# Patient Record
Sex: MALE | Employment: UNEMPLOYED | URBAN - METROPOLITAN AREA
[De-identification: names, ages, dates, MRNs, and addresses within clinical notes are randomized per-mention and may not be internally consistent; named-entity substitution may affect disease eponyms.]

---
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
## 2025-02-23 ENCOUNTER — APPOINTMENT (OUTPATIENT)
Dept: CT IMAGING | Facility: CLINIC | Age: 20
End: 2025-02-23
Attending: EMERGENCY MEDICINE

## 2025-02-23 ENCOUNTER — HOSPITAL ENCOUNTER (EMERGENCY)
Facility: CLINIC | Age: 20
Discharge: OTHER INSTITUTION | End: 2025-02-23
Attending: EMERGENCY MEDICINE | Admitting: EMERGENCY MEDICINE

## 2025-02-23 VITALS
WEIGHT: 124.56 LBS | DIASTOLIC BLOOD PRESSURE: 89 MMHG | RESPIRATION RATE: 18 BRPM | OXYGEN SATURATION: 99 % | HEART RATE: 69 BPM | SYSTOLIC BLOOD PRESSURE: 123 MMHG | BODY MASS INDEX: 21.27 KG/M2 | HEIGHT: 64 IN | TEMPERATURE: 98.5 F

## 2025-02-23 DIAGNOSIS — S02.652A CLOSED FRACTURE OF LEFT MANDIBULAR ANGLE, INITIAL ENCOUNTER (H): ICD-10-CM

## 2025-02-23 PROCEDURE — 258N000003 HC RX IP 258 OP 636: Performed by: EMERGENCY MEDICINE

## 2025-02-23 PROCEDURE — 96375 TX/PRO/DX INJ NEW DRUG ADDON: CPT

## 2025-02-23 PROCEDURE — 99285 EMERGENCY DEPT VISIT HI MDM: CPT | Mod: 25

## 2025-02-23 PROCEDURE — 250N000011 HC RX IP 250 OP 636: Performed by: EMERGENCY MEDICINE

## 2025-02-23 PROCEDURE — 70486 CT MAXILLOFACIAL W/O DYE: CPT

## 2025-02-23 PROCEDURE — 96365 THER/PROPH/DIAG IV INF INIT: CPT

## 2025-02-23 RX ORDER — AMPICILLIN AND SULBACTAM 2; 1 G/1; G/1
3 INJECTION, POWDER, FOR SOLUTION INTRAMUSCULAR; INTRAVENOUS EVERY 6 HOURS
Status: COMPLETED | OUTPATIENT
Start: 2025-02-23 | End: 2025-02-23

## 2025-02-23 RX ORDER — ONDANSETRON 2 MG/ML
4 INJECTION INTRAMUSCULAR; INTRAVENOUS EVERY 30 MIN PRN
Status: DISCONTINUED | OUTPATIENT
Start: 2025-02-23 | End: 2025-02-23 | Stop reason: HOSPADM

## 2025-02-23 RX ORDER — MORPHINE SULFATE 4 MG/ML
4 INJECTION, SOLUTION INTRAMUSCULAR; INTRAVENOUS ONCE
Status: COMPLETED | OUTPATIENT
Start: 2025-02-23 | End: 2025-02-23

## 2025-02-23 RX ORDER — MORPHINE SULFATE 4 MG/ML
4 INJECTION, SOLUTION INTRAMUSCULAR; INTRAVENOUS
Status: COMPLETED | OUTPATIENT
Start: 2025-02-23 | End: 2025-02-23

## 2025-02-23 RX ADMIN — AMPICILLIN SODIUM AND SULBACTAM SODIUM 3 G: 2; 1 INJECTION, POWDER, FOR SOLUTION INTRAMUSCULAR; INTRAVENOUS at 02:31

## 2025-02-23 RX ADMIN — ONDANSETRON 4 MG: 2 INJECTION, SOLUTION INTRAMUSCULAR; INTRAVENOUS at 01:06

## 2025-02-23 RX ADMIN — MORPHINE SULFATE 4 MG: 4 INJECTION, SOLUTION INTRAMUSCULAR; INTRAVENOUS at 01:07

## 2025-02-23 RX ADMIN — MORPHINE SULFATE 4 MG: 4 INJECTION, SOLUTION INTRAMUSCULAR; INTRAVENOUS at 01:35

## 2025-02-23 RX ADMIN — SODIUM CHLORIDE 1000 ML: 9 INJECTION, SOLUTION INTRAVENOUS at 02:32

## 2025-02-23 ASSESSMENT — COLUMBIA-SUICIDE SEVERITY RATING SCALE - C-SSRS
2. HAVE YOU ACTUALLY HAD ANY THOUGHTS OF KILLING YOURSELF IN THE PAST MONTH?: NO
1. IN THE PAST MONTH, HAVE YOU WISHED YOU WERE DEAD OR WISHED YOU COULD GO TO SLEEP AND NOT WAKE UP?: NO
6. HAVE YOU EVER DONE ANYTHING, STARTED TO DO ANYTHING, OR PREPARED TO DO ANYTHING TO END YOUR LIFE?: NO

## 2025-02-23 ASSESSMENT — ACTIVITIES OF DAILY LIVING (ADL)
ADLS_ACUITY_SCORE: 41

## 2025-02-23 NOTE — ED TRIAGE NOTES
Patient arrives to ED w/complaints of jaw/dental/head pain after falling down the stairs at his apartment. Patient denies LOC. Patient denies any other injuries from the fall. Patient is c/o pain rated 8/10 primarily in his jaw. Patient denies any dizziness/lightheadedness/chest pain/SOB.     Triage Assessment (Adult)       Row Name 02/23/25 0043          Triage Assessment    Airway WDL WDL        Respiratory WDL    Respiratory WDL WDL        Skin Circulation/Temperature WDL    Skin Circulation/Temperature WDL WDL        Cardiac WDL    Cardiac WDL WDL        Peripheral/Neurovascular WDL    Peripheral Neurovascular WDL WDL        Cognitive/Neuro/Behavioral WDL    Cognitive/Neuro/Behavioral WDL WDL

## 2025-02-23 NOTE — ED PROVIDER NOTES
"  Emergency Department Note      History of Present Illness     Chief Complaint  Mouth Injury, Dental Injury, and Head Laceration    HPI  Tiago Elias is a 19 year old male who presents the emergency room after a fall in which he took a direct hit to the corner of a staircase against his left jaw.  He states this happened shortly prior to arrival, and he has significant pain in that area and cannot really close his teeth or open or close his mouth without any significant pain.  Does have significant bleeding noted from the left side of his jaw as well.  Denies any head trauma, no nausea or vomiting.      Independent Historian  Yes patient's girlfriend is at the bedside and confirms the above history.    Review of External Notes  No recent notes      Past Medical History   Medical History and Problem List  No past medical history on file.    Medications  No current outpatient medications on file.      Surgical History   Past Surgical History:   Procedure Laterality Date    ORTHOPEDIC SURGERY      left hand         Physical Exam   Patient Vitals for the past 24 hrs:   BP Temp Temp src Pulse Resp SpO2 Height Weight   02/23/25 0040 139/85 98.5  F (36.9  C) Temporal 90 18 99 % 1.626 m (5' 4\") 56.5 kg (124 lb 9 oz)       Physical Exam  Vitals: reviewed by me  General: Pt seen on [facility], cooperative, and alert to conversation  Eyes: Tracking well, clear conjunctiva BL  ENT: MMM, midline trachea.  Oropharynx with dried blood noted throughout as well as fresh blood noted in obvious deformity in patient's left mandibular molar line.  Tolerating secretions and blood, airway patent.  Lungs: No tachypnea, no accessory muscle use. No respiratory distress.   CV: Rate as above significant swelling noted to left mandible,  MSK: no joint effusion.  No evidence of trauma  Skin: No rash  Neuro: Clear speech and no facial droop.  Psych: Not RIS, no e/o AH/VH      Diagnostics       Imaging  CT Facial Bones without " Contrast   Final Result   IMPRESSION:    1.  Displaced left mandibular angle fracture.                 Independent Interpretation  Yes I have independently reviewed the patient's mandibular CT scan, obvious fracture noted.    ED Course      Medications Administered   Medications   ondansetron (ZOFRAN) injection 4 mg (4 mg Intravenous $Given 2/23/25 0106)   ampicillin-sulbactam (UNASYN) 3 g vial to attach to  mL bag (3 g Intravenous $New Bag 2/23/25 0231)   sodium chloride 0.9% BOLUS 1,000 mL (1,000 mLs Intravenous $New Bag 2/23/25 0232)   morphine (PF) injection 4 mg (4 mg Intravenous $Given 2/23/25 0107)   morphine (PF) injection 4 mg (4 mg Intravenous $Given 2/23/25 0135)          Procedures      Discussion of Management   I placed a consult to the OMFS department at the [facility] as this is who was on the call panel for [place] and unfortunately they did not call back.  Eventually I called [facility] and spoke to Dr. Monte who very generously agreed to accept care of the patient in an ER to ER transfer for the mandibular fracture.      Optional/Additional Documentation  None      Medical Decision Making / Diagnosis         MDM  This is a very pleasant 19-year-old male who presents the emergency room with what appears to be an isolated and displaced mandibular fracture.  It does sound like this matches with his injury pattern and I do not see any other evidence of injury.  He is tolerating secretions well and bleeding seems to be stopped, and unfortunately we are unable to get our OMFS team to weigh in from the [place].  I do think the patient needs to be wired tonight and so I discussed the case with the ER attending at [facility] who is very generously agreed to accept care of the patient.  Despite great effort I am unable to convince the patient to take an ambulance even a BLS ambulance to [facility], and I am concerned that he did get some morphine before this  and also that his secretions technically could pose a risk.  However he does like to be doing quite well here and has been here for over 2 hours and is very clearly stating he does not want to incur any charges for an ambulance and so he will be going by private vehicle with his girlfriend driving.  He seems quite sad on this and it does seem to be a reasonable plan given his priorities.  My formal recommendation was of course a transfer with an ambulance, but we will proceed as above.  I do think that he needs to be wired tonight.  He did get antibiotics as well.    ICD-10 Codes:    ICD-10-CM    1. Closed fracture of left mandibular angle, initial encounter (H)  S02.652A                         Raphael Shetty MD  02/23/25 0231

## 2025-03-14 ENCOUNTER — HOSPITAL ENCOUNTER (EMERGENCY)
Facility: CLINIC | Age: 20
Discharge: HOME | End: 2025-03-14
Attending: STUDENT IN AN ORGANIZED HEALTH CARE EDUCATION/TRAINING PROGRAM | Admitting: STUDENT IN AN ORGANIZED HEALTH CARE EDUCATION/TRAINING PROGRAM

## 2025-03-14 VITALS
HEART RATE: 64 BPM | OXYGEN SATURATION: 99 % | RESPIRATION RATE: 18 BRPM | DIASTOLIC BLOOD PRESSURE: 73 MMHG | SYSTOLIC BLOOD PRESSURE: 133 MMHG | HEIGHT: 64 IN | BODY MASS INDEX: 20.7 KG/M2 | TEMPERATURE: 99.1 F | WEIGHT: 121.25 LBS

## 2025-03-14 DIAGNOSIS — G89.18 ACUTE POST-OPERATIVE PAIN: Primary | ICD-10-CM

## 2025-03-14 DIAGNOSIS — R51.9 FACIAL PAIN: ICD-10-CM

## 2025-03-14 DIAGNOSIS — S02.652D CLOSED FRACTURE OF LEFT MANDIBULAR ANGLE WITH ROUTINE HEALING, SUBSEQUENT ENCOUNTER: ICD-10-CM

## 2025-03-14 PROBLEM — D69.6 THROMBOCYTOPENIA: Status: ACTIVE | Noted: 2019-12-06

## 2025-03-14 PROBLEM — S02.652B: Status: ACTIVE | Noted: 2025-02-23

## 2025-03-14 LAB
ANION GAP SERPL CALCULATED.3IONS-SCNC: 11 MMOL/L (ref 7–15)
BASOPHILS # BLD AUTO: 0.1 10E3/UL (ref 0–0.2)
BASOPHILS NFR BLD AUTO: 1 %
BUN SERPL-MCNC: 14.9 MG/DL (ref 6–20)
CALCIUM SERPL-MCNC: 9.1 MG/DL (ref 8.8–10.4)
CHLORIDE SERPL-SCNC: 103 MMOL/L (ref 98–107)
CREAT SERPL-MCNC: 0.8 MG/DL (ref 0.67–1.17)
EGFRCR SERPLBLD CKD-EPI 2021: >90 ML/MIN/1.73M2
EOSINOPHIL # BLD AUTO: 0.1 10E3/UL (ref 0–0.7)
EOSINOPHIL NFR BLD AUTO: 1 %
ERYTHROCYTE [DISTWIDTH] IN BLOOD BY AUTOMATED COUNT: 12.6 % (ref 10–15)
FLUAV RNA SPEC QL NAA+PROBE: NEGATIVE
FLUBV RNA RESP QL NAA+PROBE: NEGATIVE
GLUCOSE SERPL-MCNC: 107 MG/DL (ref 70–99)
HCO3 SERPL-SCNC: 24 MMOL/L (ref 22–29)
HCT VFR BLD AUTO: 39 % (ref 40–53)
HGB BLD-MCNC: 13.5 G/DL (ref 13.3–17.7)
IMM GRANULOCYTES # BLD: 0 10E3/UL
IMM GRANULOCYTES NFR BLD: 0 %
LYMPHOCYTES # BLD AUTO: 2.8 10E3/UL (ref 0.8–5.3)
LYMPHOCYTES NFR BLD AUTO: 39 %
MCH RBC QN AUTO: 30 PG (ref 26.5–33)
MCHC RBC AUTO-ENTMCNC: 34.6 G/DL (ref 31.5–36.5)
MCV RBC AUTO: 87 FL (ref 78–100)
MONOCYTES # BLD AUTO: 0.5 10E3/UL (ref 0–1.3)
MONOCYTES NFR BLD AUTO: 7 %
NEUTROPHILS # BLD AUTO: 3.7 10E3/UL (ref 1.6–8.3)
NEUTROPHILS NFR BLD AUTO: 52 %
NRBC # BLD AUTO: 0 10E3/UL
NRBC BLD AUTO-RTO: 0 /100
PLATELET # BLD AUTO: 128 10E3/UL (ref 150–450)
POTASSIUM SERPL-SCNC: 3.4 MMOL/L (ref 3.4–5.3)
RBC # BLD AUTO: 4.5 10E6/UL (ref 4.4–5.9)
RSV RNA SPEC NAA+PROBE: NEGATIVE
SARS-COV-2 RNA RESP QL NAA+PROBE: NEGATIVE
SODIUM SERPL-SCNC: 138 MMOL/L (ref 135–145)
WBC # BLD AUTO: 7.1 10E3/UL (ref 4–11)

## 2025-03-14 PROCEDURE — 99283 EMERGENCY DEPT VISIT LOW MDM: CPT

## 2025-03-14 PROCEDURE — 80048 BASIC METABOLIC PNL TOTAL CA: CPT | Performed by: STUDENT IN AN ORGANIZED HEALTH CARE EDUCATION/TRAINING PROGRAM

## 2025-03-14 PROCEDURE — 36415 COLL VENOUS BLD VENIPUNCTURE: CPT | Performed by: STUDENT IN AN ORGANIZED HEALTH CARE EDUCATION/TRAINING PROGRAM

## 2025-03-14 PROCEDURE — 250N000013 HC RX MED GY IP 250 OP 250 PS 637: Performed by: EMERGENCY MEDICINE

## 2025-03-14 PROCEDURE — 85049 AUTOMATED PLATELET COUNT: CPT | Performed by: STUDENT IN AN ORGANIZED HEALTH CARE EDUCATION/TRAINING PROGRAM

## 2025-03-14 PROCEDURE — 85014 HEMATOCRIT: CPT | Performed by: STUDENT IN AN ORGANIZED HEALTH CARE EDUCATION/TRAINING PROGRAM

## 2025-03-14 PROCEDURE — 85004 AUTOMATED DIFF WBC COUNT: CPT | Performed by: STUDENT IN AN ORGANIZED HEALTH CARE EDUCATION/TRAINING PROGRAM

## 2025-03-14 PROCEDURE — 250N000013 HC RX MED GY IP 250 OP 250 PS 637: Performed by: STUDENT IN AN ORGANIZED HEALTH CARE EDUCATION/TRAINING PROGRAM

## 2025-03-14 PROCEDURE — 87637 SARSCOV2&INF A&B&RSV AMP PRB: CPT | Performed by: STUDENT IN AN ORGANIZED HEALTH CARE EDUCATION/TRAINING PROGRAM

## 2025-03-14 PROCEDURE — 82374 ASSAY BLOOD CARBON DIOXIDE: CPT | Performed by: STUDENT IN AN ORGANIZED HEALTH CARE EDUCATION/TRAINING PROGRAM

## 2025-03-14 RX ORDER — OXYCODONE HYDROCHLORIDE 5 MG/1
5 TABLET ORAL ONCE
Status: COMPLETED | OUTPATIENT
Start: 2025-03-14 | End: 2025-03-14

## 2025-03-14 RX ORDER — ACETAMINOPHEN 500 MG
1000 TABLET ORAL EVERY 4 HOURS PRN
Status: DISCONTINUED | OUTPATIENT
Start: 2025-03-14 | End: 2025-03-14 | Stop reason: HOSPADM

## 2025-03-14 RX ORDER — OXYCODONE HYDROCHLORIDE 5 MG/1
5 TABLET ORAL EVERY 6 HOURS PRN
Qty: 10 TABLET | Refills: 0 | Status: SHIPPED | OUTPATIENT
Start: 2025-03-14 | End: 2025-03-17

## 2025-03-14 RX ADMIN — OXYCODONE HYDROCHLORIDE 5 MG: 5 TABLET ORAL at 18:56

## 2025-03-14 RX ADMIN — ACETAMINOPHEN 1000 MG: 500 TABLET, FILM COATED ORAL at 17:51

## 2025-03-14 ASSESSMENT — COLUMBIA-SUICIDE SEVERITY RATING SCALE - C-SSRS
6. HAVE YOU EVER DONE ANYTHING, STARTED TO DO ANYTHING, OR PREPARED TO DO ANYTHING TO END YOUR LIFE?: NO
2. HAVE YOU ACTUALLY HAD ANY THOUGHTS OF KILLING YOURSELF IN THE PAST MONTH?: NO
1. IN THE PAST MONTH, HAVE YOU WISHED YOU WERE DEAD OR WISHED YOU COULD GO TO SLEEP AND NOT WAKE UP?: NO

## 2025-03-14 ASSESSMENT — ACTIVITIES OF DAILY LIVING (ADL)
ADLS_ACUITY_SCORE: 41

## 2025-03-14 NOTE — ED TRIAGE NOTES
2/23 pt had jaw surgery with screws put in his mouth. Screws are suppose to come out in 2 weeks but pain is unbearable. Sp[facility] who told him he could go into the hospital to get them taken out early.

## 2025-03-14 NOTE — ED PROVIDER NOTES
"  Emergency Department Note      History of Present Illness     Chief Complaint   Dental Pain and Post-op Problem      HPI   Tiago Elias is a pleasant 19 year old male presenting with dental pain and post-operation problem. He reports pain in his jaw radiating to his head.  On 2/23/2025, the patient underwent open reduction and internal fixation of the left mandible with arch bar placement after facial trauma and a mandible fracture.  He was managing his pain with oxycodone along with acetaminophen and ibuprofen but he finished oxycodone last week. The left-sided jaw pain returned two days ago and has been constant. He also endorses a headache that began today. He called [facility] surgery center who he states told him to come in ED to get the hardware taken out. Denies cough, nausea, vomiting, diarrhea, abdominal pain, chest pain, or urinary symptoms.  Of note, patient did not feel feverish prior to arrival.    Independent Historian   None    Review of External Notes   I personally reviewed notes from the patient's discharge summary dated  2/23/25 . This provided me with information regarding patient's recent clinical course.     Past Medical History   Medical History and Problem List   Asthma  Thrombocytopenia     Medications   Oxycodone   Amoxicillin   Zofran     Surgical History   Left hand surgery - foreign body removal  Physical Exam   Patient Vitals for the past 24 hrs:   BP Temp Temp src Pulse Resp SpO2 Height Weight   03/14/25 2025 133/73 -- -- 64 -- 99 % -- --   03/14/25 1850 136/79 99.1  F (37.3  C) Oral -- -- 97 % -- --   03/14/25 1840 -- -- -- -- -- 97 % -- --   03/14/25 1830 113/51 -- -- 72 -- 99 % -- --   03/14/25 1820 121/69 -- -- -- -- 98 % -- --   03/14/25 1805 132/72 -- -- 69 -- 98 % -- --   03/14/25 1746 128/73 (!) 104.6  F (40.3  C) Temporal 86 18 98 % 1.626 m (5' 4\") 55 kg (121 lb 4.1 oz)     Physical Exam  Vitals and nursing note reviewed.   Constitutional:       General: He is not in " acute distress.     Appearance: Normal appearance. He is not ill-appearing or diaphoretic.   HENT:      Head: Atraumatic.      Nose: Nose normal.      Mouth/Throat:      Mouth: Mucous membranes are moist.      Dentition: No dental abscesses.      Pharynx: Oropharynx is clear. No pharyngeal swelling, oropharyngeal exudate, posterior oropharyngeal erythema or uvula swelling.      Tonsils: No tonsillar exudate or tonsillar abscesses.      Comments: There are arch bars from ORIF of left mandible in place   Eyes:      Conjunctiva/sclera: Conjunctivae normal.   Cardiovascular:      Rate and Rhythm: Normal rate and regular rhythm.   Pulmonary:      Effort: Pulmonary effort is normal.   Musculoskeletal:      Cervical back: Neck supple. No rigidity or tenderness.   Lymphadenopathy:      Cervical: No cervical adenopathy.   Skin:     General: Skin is warm and dry.   Neurological:      General: No focal deficit present.      Mental Status: He is alert and oriented to person, place, and time.         Diagnostics   Lab Results   Labs Ordered and Resulted from Time of ED Arrival to Time of ED Departure   BASIC METABOLIC PANEL - Abnormal       Result Value    Sodium 138      Potassium 3.4      Chloride 103      Carbon Dioxide (CO2) 24      Anion Gap 11      Urea Nitrogen 14.9      Creatinine 0.80      GFR Estimate >90      Calcium 9.1      Glucose 107 (*)    CBC WITH PLATELETS AND DIFFERENTIAL - Abnormal    WBC Count 7.1      RBC Count 4.50      Hemoglobin 13.5      Hematocrit 39.0 (*)     MCV 87      MCH 30.0      MCHC 34.6      RDW 12.6      Platelet Count 128 (*)     % Neutrophils 52      % Lymphocytes 39      % Monocytes 7      % Eosinophils 1      % Basophils 1      % Immature Granulocytes 0      NRBCs per 100 WBC 0      Absolute Neutrophils 3.7      Absolute Lymphocytes 2.8      Absolute Monocytes 0.5      Absolute Eosinophils 0.1      Absolute Basophils 0.1      Absolute Immature Granulocytes 0.0      Absolute NRBCs 0.0      INFLUENZA A/B, RSV AND SARS-COV2 PCR - Normal    Influenza A PCR Negative      Influenza B PCR Negative      RSV PCR Negative      SARS CoV2 PCR Negative       Imaging   No orders to display     EKG   No ECG performed.     Independent Interpretation   None  ED Course    Medications Administered   Medications   oxyCODONE (ROXICODONE) tablet 5 mg (5 mg Oral $Given 3/14/25 1856)     Procedures   Procedures   None performed    Discussion of Management   None    ED Course   ED Course as of 03/15/25 0056   Fri Mar 14, 2025   1843 I obtained history and examined the patient as noted above.    2036 I rechecked the patient and explained findings. I prepared the patient to be discharged home.      Additional Documentation  None  Medical Decision Making / Diagnosis   CMS Diagnoses: None    MIPS       None    MDM   Patient presenting with facial pain.  Oddly, on arrival, patient's temperature was greater than 104.  Question whether this may be an error, since patient denied fever or chills on my evaluation.  On my assessment, patient's main concern is to have his arch bars removed.  I explained to him that this is not something that I am able to do in the emergency department and he needs to follow-up with his ENT team in order to accomplish this.  On examination, I do not appreciate any complications of surgery such as infection or abscess.  There is no evidence of deep space head or neck infection.  Patient does not have any other symptoms apart from pain.  With the questionable fever on arrival, I did obtain basic labs, which were reassuring with no leukocytosis.  Patient also tested negative for influenza and COVID.  Pain management seems to be the patient's primary issue at this time so we will discharge him with short course of oxycodone for breakthrough pain, continue to use acetaminophen ibuprofen, and follow-up with ENT on Monday for reassessment.    Disposition   The patient was discharged.     Diagnosis      ICD-10-CM    1. Acute post-operative pain  G89.18       2. Facial pain  R51.9       3. Closed fracture of left mandibular angle with routine healing, subsequent encounter  S02.652D            Discharge Medications   Discharge Medication List as of 3/14/2025  8:38 PM        START taking these medications    Details   oxyCODONE (ROXICODONE) 5 MG tablet Take 1 tablet (5 mg) by mouth every 6 hours as needed for severe pain., Disp-10 tablet, R-0, E-Prescribe           Scribe Disclosure:  I, Angelica Jaquez, am serving as a scribe at 6:48 PM on 3/14/2025 to document services personally performed by Hank Boogie MD based on my observations and the provider's statements to me.     Hank Boogie MD  03/15/25 0056

## 2025-03-15 NOTE — DISCHARGE INSTRUCTIONS
Thank you for allowing us to evaluate you today.  Follow up with ENT in 3 days for reevaluation..  For pain, use acetaminophen (Tylenol) and ibuprofen.  Take the oxycodone as prescribed for breakthrough severe pain.   Please read the guidance provided with your discharge instructions.  Immediately return to the emergency department with any concerns.